# Patient Record
Sex: MALE | Race: WHITE | Employment: FULL TIME | URBAN - METROPOLITAN AREA
[De-identification: names, ages, dates, MRNs, and addresses within clinical notes are randomized per-mention and may not be internally consistent; named-entity substitution may affect disease eponyms.]

---

## 2017-10-13 DIAGNOSIS — G47.33 OBSTRUCTIVE SLEEP APNEA SYNDROME: Primary | ICD-10-CM

## 2019-08-28 ENCOUNTER — OFFICE VISIT (OUTPATIENT)
Dept: FAMILY MEDICINE | Facility: CLINIC | Age: 24
End: 2019-08-28
Payer: COMMERCIAL

## 2019-08-28 VITALS
DIASTOLIC BLOOD PRESSURE: 61 MMHG | WEIGHT: 227 LBS | HEART RATE: 61 BPM | SYSTOLIC BLOOD PRESSURE: 126 MMHG | OXYGEN SATURATION: 97 % | HEIGHT: 72 IN | BODY MASS INDEX: 30.75 KG/M2

## 2019-08-28 DIAGNOSIS — R68.82 DECREASED LIBIDO: ICD-10-CM

## 2019-08-28 DIAGNOSIS — Z13.220 LIPID SCREENING: ICD-10-CM

## 2019-08-28 DIAGNOSIS — G47.33 OSA (OBSTRUCTIVE SLEEP APNEA): Primary | ICD-10-CM

## 2019-08-28 DIAGNOSIS — R53.83 OTHER FATIGUE: ICD-10-CM

## 2019-08-28 PROCEDURE — 99203 OFFICE O/P NEW LOW 30 MIN: CPT | Performed by: INTERNAL MEDICINE

## 2019-08-28 SDOH — HEALTH STABILITY: MENTAL HEALTH: HOW MANY STANDARD DRINKS CONTAINING ALCOHOL DO YOU HAVE ON A TYPICAL DAY?: 1 OR 2

## 2019-08-28 SDOH — HEALTH STABILITY: MENTAL HEALTH: HOW OFTEN DO YOU HAVE 6 OR MORE DRINKS ON ONE OCCASION?: LESS THAN MONTHLY

## 2019-08-28 SDOH — HEALTH STABILITY: MENTAL HEALTH: HOW OFTEN DO YOU HAVE A DRINK CONTAINING ALCOHOL?: 2-4 TIMES A MONTH

## 2019-08-28 ASSESSMENT — MIFFLIN-ST. JEOR: SCORE: 2052.67

## 2019-08-28 NOTE — PROGRESS NOTES
Subjective     Prem Peters is a 23 year old male who presents to clinic today for the following health issues:    HPI   New Patient/Transfer of Care  Patient presenting to establish care and discuss some ongoing chronic and active medical issues; he had a history of obstructive sleep apnea diagnosed long time ago and recently 9 months ago had a home sleep study; patient is noncompliant with using his CPAP machine, he is intolerant to the facemask, describes he is a mouth breather, he feels like he becomes claustrophobic using the mask and removes the mask at night while sleeping.  He feels fatigued first couple hours when he wakes up, he works as a  from 3 PM to 9 PM.  He does not feel refreshed after good night sleep.  He does a lot of physical training and workout and does take some extra protein powder supplements.  He has some decreased libido over the last 2 to 3 months and he wants to be tested for testosterone.  He denies any chest pain, palpitations, dizziness or muscular pain.  Denies any other systemic complaints, no depression but does have some anxiety when talking to people.  family history of obstructive sleep apnea in both his parents.  His mom also has diagnosis polyangiitis granulomatosis.    Patient Active Problem List   Diagnosis     RON (obstructive sleep apnea)     Other fatigue     Decreased libido     Lipid screening     Past Surgical History:   Procedure Laterality Date     HC KNEE SCOPE,SINGLE MENISECTOMY      RIGHT MEDIAL MENISCUS TEAR     HERNIA REPAIR      RIGHT INGUINAL AT AGE 3     TONSILLECTOMY      2-3 YRS AGO       Social History     Tobacco Use     Smoking status: Never Smoker     Smokeless tobacco: Never Used   Substance Use Topics     Alcohol use: Yes     Alcohol/week: 0.0 oz     Frequency: 2-4 times a month     Drinks per session: 1 or 2     Binge frequency: Less than monthly     Family History   Problem Relation Age of Onset     Sleep Apnea Mother      Sleep  Apnea Father      Anxiety Disorder Father          No current outpatient medications on file.     No Known Allergies  Recent Labs   Lab Test 08/30/19  0850   *   HDL 51   TRIG 46   ALT 34   CR 1.11   GFRESTIMATED >90   GFRESTBLACK >90   POTASSIUM 4.3   TSH 1.68      BP Readings from Last 3 Encounters:   08/28/19 126/61   06/09/16 124/60    Wt Readings from Last 3 Encounters:   08/28/19 103 kg (227 lb)   06/09/16 91.9 kg (202 lb 9.6 oz)                    Reviewed and updated as needed this visit by Provider  Tobacco  Med Hx  Surg Hx  Soc Hx        Review of Systems   ROS COMP: Constitutional, HEENT, cardiovascular, pulmonary, gi and gu systems are negative, except as otherwise noted.      Objective    /61 (BP Location: Right arm, Patient Position: Sitting, Cuff Size: Adult Large)   Pulse 61   Ht 1.829 m (6')   Wt 103 kg (227 lb)   SpO2 97%   BMI 30.79 kg/m    Body mass index is 30.79 kg/m .  Physical Exam   GENERAL: healthy, alert and no distress, well-built and muscular.  EYES: Eyes grossly normal to inspection, PERRL and conjunctivae and sclerae normal  NECK: no adenopathy, no asymmetry, masses, or scars and thyroid normal to palpation  RESP: lungs clear to auscultation - no rales, rhonchi or wheezes  CV: regular rate and rhythm, normal S1 S2, no S3 or S4, no murmur, click or rub, no peripheral edema and peripheral pulses strong  ABDOMEN: soft, nontender, no hepatosplenomegaly, no masses and bowel sounds normal  MS: no gross musculoskeletal defects noted, no edema  SKIN: no suspicious lesions or rashes with flushed facies cheeks  NEURO: Normal strength and tone, mentation intact and speech normal  PSYCH: mentation appears normal, affect normal/bright    Diagnostic Test Results:  Labs reviewed in Epic        Assessment & Plan   Problem List Items Addressed This Visit        Respiratory    RON (obstructive sleep apnea) - Primary    Relevant Orders    SLEEP EVALUATION & MANAGEMENT REFERRAL -  ADULT -[facility] [phone]  (Age 18 and up)    CBC with platelets (Completed)       Behavioral    Decreased libido    Relevant Orders    Testosterone, total (Completed)       Other    Other fatigue    Relevant Orders    SLEEP EVALUATION & MANAGEMENT REFERRAL - ADULT [facility] [phone]  (Age 18 and up)    Comprehensive metabolic panel (Completed)    Testosterone, total (Completed)    TSH with free T4 reflex (Completed)    Lipid screening    Relevant Orders    Lipid panel reflex to direct LDL Fasting (Completed)         Advised importance of compliance with the CPAP machine.  Probably is most contributor to his fatigue.  We will check a CBC make sure he does not have polycythemia, he does have severe facial erythema; he attributes to anxiety.  He would like testosterone to be checked for some decreased libido over the last to 3 months he has been with the same partner for long time, also has some ED problems over the last couple months.  Denies any unusual stressors.  Advised him to cut on the excessive intake of protein powder that can increase his creatinine and keep well-hydrated.  Counseled on healthy diet and exercise; which he has been actively doing as a .    BMI:   Estimated body mass index is 30.79 kg/m  as calculated from the following:    Height as of this encounter: 1.829 m (6').    Weight as of this encounter: 103 kg (227 lb).   Weight management plan: Discussed healthy diet and exercise guidelines        Work on weight loss  Regular exercise  See Patient Instructions  Return in about 3 months (around 11/28/2019), or RON, FATIGUE, for Routine Visit.    .froylan Harrison MD  [facility]

## 2019-08-30 DIAGNOSIS — R68.82 DECREASED LIBIDO: ICD-10-CM

## 2019-08-30 DIAGNOSIS — Z13.220 LIPID SCREENING: ICD-10-CM

## 2019-08-30 DIAGNOSIS — R53.83 OTHER FATIGUE: ICD-10-CM

## 2019-08-30 DIAGNOSIS — G47.33 OSA (OBSTRUCTIVE SLEEP APNEA): ICD-10-CM

## 2019-08-30 LAB
ALBUMIN SERPL-MCNC: 4.2 G/DL (ref 3.4–5)
ALP SERPL-CCNC: 52 U/L (ref 40–150)
ALT SERPL W P-5'-P-CCNC: 34 U/L (ref 0–70)
ANION GAP SERPL CALCULATED.3IONS-SCNC: 4 MMOL/L (ref 3–14)
AST SERPL W P-5'-P-CCNC: 15 U/L (ref 0–45)
BILIRUB SERPL-MCNC: 0.9 MG/DL (ref 0.2–1.3)
BUN SERPL-MCNC: 20 MG/DL (ref 7–30)
CALCIUM SERPL-MCNC: 9.1 MG/DL (ref 8.5–10.1)
CHLORIDE SERPL-SCNC: 108 MMOL/L (ref 94–109)
CHOLEST SERPL-MCNC: 182 MG/DL
CO2 SERPL-SCNC: 30 MMOL/L (ref 20–32)
CREAT SERPL-MCNC: 1.11 MG/DL (ref 0.66–1.25)
ERYTHROCYTE [DISTWIDTH] IN BLOOD BY AUTOMATED COUNT: 11.6 % (ref 10–15)
GFR SERPL CREATININE-BSD FRML MDRD: >90 ML/MIN/{1.73_M2}
GLUCOSE SERPL-MCNC: 95 MG/DL (ref 70–99)
HCT VFR BLD AUTO: 44.1 % (ref 40–53)
HDLC SERPL-MCNC: 51 MG/DL
HGB BLD-MCNC: 15 G/DL (ref 13.3–17.7)
LDLC SERPL CALC-MCNC: 122 MG/DL
MCH RBC QN AUTO: 32.1 PG (ref 26.5–33)
MCHC RBC AUTO-ENTMCNC: 34 G/DL (ref 31.5–36.5)
MCV RBC AUTO: 94 FL (ref 78–100)
NONHDLC SERPL-MCNC: 131 MG/DL
PLATELET # BLD AUTO: 156 10E9/L (ref 150–450)
POTASSIUM SERPL-SCNC: 4.3 MMOL/L (ref 3.4–5.3)
PROT SERPL-MCNC: 6.9 G/DL (ref 6.8–8.8)
RBC # BLD AUTO: 4.68 10E12/L (ref 4.4–5.9)
SODIUM SERPL-SCNC: 142 MMOL/L (ref 133–144)
TRIGL SERPL-MCNC: 46 MG/DL
TSH SERPL DL<=0.005 MIU/L-ACNC: 1.68 MU/L (ref 0.4–4)
WBC # BLD AUTO: 4.4 10E9/L (ref 4–11)

## 2019-08-30 PROCEDURE — 84403 ASSAY OF TOTAL TESTOSTERONE: CPT | Performed by: INTERNAL MEDICINE

## 2019-08-30 PROCEDURE — 84443 ASSAY THYROID STIM HORMONE: CPT | Performed by: INTERNAL MEDICINE

## 2019-08-30 PROCEDURE — 85027 COMPLETE CBC AUTOMATED: CPT | Performed by: INTERNAL MEDICINE

## 2019-08-30 PROCEDURE — 80053 COMPREHEN METABOLIC PANEL: CPT | Performed by: INTERNAL MEDICINE

## 2019-08-30 PROCEDURE — 80061 LIPID PANEL: CPT | Performed by: INTERNAL MEDICINE

## 2019-08-30 PROCEDURE — 36415 COLL VENOUS BLD VENIPUNCTURE: CPT | Performed by: INTERNAL MEDICINE

## 2019-08-30 NOTE — LETTER
[facility]  [address] [place] [address] [address] [facility]  [address]  [place]  [address]  Tel: [phone]    September 3, 2019    Prem Peters  [address]  [address]  [place] [address]          Prem,     Your labs were all reviewed looks fine, including your kidney, electrolytes and liver enzymes are all normal.  your thyroid test called TSH is normal at 1.68   Your lipid shows a total cholesterol 182, triglycerides of 46, HDL good cholesterol of 51 normal;   LDL bad cholesterol slightly elevated at 122; continue to make changes in your diet low-fat low-cholesterol diet, exercise and weight loss as tolerated.   CBD shows normal white blood cell count ,normal hemoglobin hematocrit, normal platelet count.   Please be reassured.     If you have any further questions or problems, please contact our office.      Sincerely,    Jermaine Harrison MD /lori        Resulted Orders   Comprehensive metabolic panel   Result Value Ref Range    Sodium 142 133 - 144 mmol/L    Potassium 4.3 3.4 - 5.3 mmol/L    Chloride 108 94 - 109 mmol/L    Carbon Dioxide 30 20 - 32 mmol/L    Anion Gap 4 3 - 14 mmol/L    Glucose 95 70 - 99 mg/dL      Comment:      Fasting specimen    Urea Nitrogen 20 7 - 30 mg/dL    Creatinine 1.11 0.66 - 1.25 mg/dL    GFR Estimate >90 >60 mL/min/[1.73_m2]      Comment:      Non  GFR Calc  Starting 12/18/2018, serum creatinine based estimated GFR (eGFR) will be   calculated using the Chronic Kidney Disease Epidemiology Collaboration   (CKD-EPI) equation.      GFR Estimate If Black >90 >60 mL/min/[1.73_m2]      Comment:       GFR Calc  Starting 12/18/2018, serum creatinine based estimated GFR (eGFR) will be   calculated using the Chronic Kidney Disease Epidemiology Collaboration   (CKD-EPI) equation.      Calcium 9.1 8.5 - 10.1 mg/dL    Bilirubin Total 0.9 0.2 - 1.3 mg/dL    Albumin 4.2 3.4 - 5.0 g/dL    Protein Total 6.9 6.8 - 8.8 g/dL    Alkaline Phosphatase 52 40 - 150 U/L    ALT 34 0 -  70 U/L    AST 15 0 - 45 U/L   CBC with platelets   Result Value Ref Range    WBC 4.4 4.0 - 11.0 10e9/L    RBC Count 4.68 4.4 - 5.9 10e12/L    Hemoglobin 15.0 13.3 - 17.7 g/dL    Hematocrit 44.1 40.0 - 53.0 %    MCV 94 78 - 100 fl    MCH 32.1 26.5 - 33.0 pg    MCHC 34.0 31.5 - 36.5 g/dL    RDW 11.6 10.0 - 15.0 %    Platelet Count 156 150 - 450 10e9/L   TSH with free T4 reflex   Result Value Ref Range    TSH 1.68 0.40 - 4.00 mU/L   Lipid panel reflex to direct LDL Fasting   Result Value Ref Range    Cholesterol 182 <200 mg/dL    Triglycerides 46 <150 mg/dL      Comment:      Fasting specimen    HDL Cholesterol 51 >39 mg/dL    LDL Cholesterol Calculated 122 (H) <100 mg/dL      Comment:      Above desirable:  100-129 mg/dl  Borderline High:  130-159 mg/dL  High:             160-189 mg/dL  Very high:       >189 mg/dl      Non HDL Cholesterol 131 (H) <130 mg/dL      Comment:      Above Desirable:  130-159 mg/dl  Borderline high:  160-189 mg/dl  High:             190-219 mg/dl  Very high:       >219 mg/dl

## 2019-09-03 NOTE — RESULT ENCOUNTER NOTE
Please notify patient of the following lab results:  Prem;  Your labs were all reviewed looks fine, including your kidney, electrolytes and liver enzymes are all normal.  your thyroid test called TSH is normal at 1.68  Your lipid shows a total cholesterol 182, triglycerides of 46, HDL good cholesterol of 51 normal;  LDL bad cholesterol slightly elevated at 122; continue to make changes in your diet low-fat low-cholesterol diet, exercise and weight loss as tolerated.  CBD shows normal white blood cell count ,normal hemoglobin hematocrit, normal platelet count.  Please be reassured  Dr. Harrison

## 2019-09-04 LAB — TESTOST SERPL-MCNC: 1043 NG/DL (ref 240–950)

## 2019-09-05 ENCOUNTER — TELEPHONE (OUTPATIENT)
Dept: FAMILY MEDICINE | Facility: CLINIC | Age: 24
End: 2019-09-05

## 2019-09-05 DIAGNOSIS — R79.89 ELEVATED TESTOSTERONE LEVEL: Primary | ICD-10-CM

## 2019-09-05 NOTE — TELEPHONE ENCOUNTER
Left message asking patient to call back     Ludy APPLE RN      Result Notes for Testosterone, total     Notes recorded by Jermaine Harrison MD on 9/5/2019 at 5:05 AM CDT  Please call patient with the following lab results  Prem,  Your testosterone level was very elevated at 1043, please abstain from using any extra testosterone supplements.  If you are not taking any extra testosterone supplements and the level continues to go up we will need to do an ultrasound of the scrotal area/adrenal area to make sure there is no source of the increased testosterone  We will repeat testosterone in 4 weeks [in the morning level at 8 AM] to make sure level is decreasing  Any further questions happy to address  Dr. Harrison

## 2019-09-05 NOTE — RESULT ENCOUNTER NOTE
Please call patient with the following lab results  Prem,  Your testosterone level was very elevated at 1043, please abstain from using any extra testosterone supplements.  If you are not taking any extra testosterone supplements and the level continues to go up we will need to do an ultrasound of the scrotal area/adrenal area to make sure there is no source of the increased testosterone  We will repeat testosterone in 4 weeks [in the morning level at 8 AM] to make sure level is decreasing  Any further questions happy to address  Dr. Harrison

## 2019-09-06 NOTE — TELEPHONE ENCOUNTER
Attempt #2.  Non-detailed message left for patient to return call  JORGE RoachN, RN  [employer] [facility]

## 2019-09-10 NOTE — TELEPHONE ENCOUNTER
TO PCP:     Reached patient     Not taking any testosterone supplements - has not ever taken them      Poor sleep recently so pt was surprised thinking it would be low     Future lab visit set up in AM 9/30    Lab order pended (will need dx associated)     Ludy APPLE RN

## 2019-09-17 NOTE — TELEPHONE ENCOUNTER
PCP,    Pt scheduled at 7:15 AM for repeat testosterone lab draw. Will that be OK (instead of 8 AM?)?    Thank you,  Rivas MORENO RN

## 2019-09-30 DIAGNOSIS — R79.89 ELEVATED TESTOSTERONE LEVEL: ICD-10-CM

## 2019-09-30 PROCEDURE — 84403 ASSAY OF TOTAL TESTOSTERONE: CPT | Performed by: INTERNAL MEDICINE

## 2019-09-30 PROCEDURE — 36415 COLL VENOUS BLD VENIPUNCTURE: CPT | Performed by: INTERNAL MEDICINE

## 2019-10-02 LAB — TESTOST SERPL-MCNC: 1083 NG/DL (ref 240–950)

## 2019-10-03 DIAGNOSIS — R79.89 ELEVATED TESTOSTERONE LEVEL: Primary | ICD-10-CM

## 2019-10-04 ENCOUNTER — TELEPHONE (OUTPATIENT)
Dept: FAMILY MEDICINE | Facility: CLINIC | Age: 24
End: 2019-10-04

## 2019-10-04 DIAGNOSIS — R79.89 ELEVATED TESTOSTERONE LEVEL: Primary | ICD-10-CM

## 2019-10-04 NOTE — TELEPHONE ENCOUNTER
Called patient to determine where he would like us to fax external referral:     NO answer, left Vm to return call and provide     NAME OF CLINIC   FAX#     Pt Reps: Please gather this information on call back and let pt know we will fax to his preferred location     Thank you,   Nevaeh OCHOA RN

## 2019-10-04 NOTE — RESULT ENCOUNTER NOTE
Prem,  I reviewed your labs your testosterone level is still very elevated at 1083.  I recommend you see endocrinology for further evaluation of the increase.  If you agree we can put a referral ASAP.  Dr. Harrison

## 2019-10-04 NOTE — TELEPHONE ENCOUNTER
[facility] patients insurance does not require referrals.    Rachel Narvaez  Referral Coordinator

## 2019-10-04 NOTE — TELEPHONE ENCOUNTER
Reason for Call:  Referral Request    Detailed comments:   Pt is interested in referral.  Offered appt with [facility], however, pt didn't want to wait.  Please enter referral.  Pt will call with fax to let us know where he would like to go for endocrinology for testosterone.

## 2019-12-05 ENCOUNTER — TELEPHONE (OUTPATIENT)
Dept: GASTROENTEROLOGY | Facility: CLINIC | Age: 24
End: 2019-12-05

## 2019-12-05 NOTE — TELEPHONE ENCOUNTER
Left a detailed message for the referral process. No reason for request for clinic visit in the message from the call center. Left the process and access at this time. Left my name and number.

## 2019-12-05 NOTE — TELEPHONE ENCOUNTER
[facility] Call Center    Phone Message    May a detailed message be left on voicemail: yes    Reason for Call: Other: Pt called in wanting to be scheduled for an appointment. Pt did not have a referral and was directed toget all of his records sent to us for review. Please advise     Action Taken: Message routed to:  [facility] ([facility] Gastro

## 2019-12-11 ENCOUNTER — OFFICE VISIT (OUTPATIENT)
Dept: SLEEP MEDICINE | Facility: CLINIC | Age: 24
End: 2019-12-11
Payer: COMMERCIAL

## 2019-12-11 VITALS
WEIGHT: 226 LBS | HEART RATE: 52 BPM | SYSTOLIC BLOOD PRESSURE: 136 MMHG | HEIGHT: 72 IN | BODY MASS INDEX: 30.61 KG/M2 | DIASTOLIC BLOOD PRESSURE: 70 MMHG | OXYGEN SATURATION: 98 %

## 2019-12-11 DIAGNOSIS — R53.83 MALAISE AND FATIGUE: ICD-10-CM

## 2019-12-11 DIAGNOSIS — Z72.820 LACK OF ADEQUATE SLEEP: ICD-10-CM

## 2019-12-11 DIAGNOSIS — E66.09 CLASS 1 OBESITY DUE TO EXCESS CALORIES WITH SERIOUS COMORBIDITY AND BODY MASS INDEX (BMI) OF 30.0 TO 30.9 IN ADULT: ICD-10-CM

## 2019-12-11 DIAGNOSIS — E66.811 CLASS 1 OBESITY DUE TO EXCESS CALORIES WITH SERIOUS COMORBIDITY AND BODY MASS INDEX (BMI) OF 30.0 TO 30.9 IN ADULT: ICD-10-CM

## 2019-12-11 DIAGNOSIS — R06.89 DYSPNEA AND RESPIRATORY ABNORMALITY: Primary | ICD-10-CM

## 2019-12-11 DIAGNOSIS — G47.30 SLEEP APNEA, UNSPECIFIED TYPE: ICD-10-CM

## 2019-12-11 DIAGNOSIS — R06.00 DYSPNEA AND RESPIRATORY ABNORMALITY: Primary | ICD-10-CM

## 2019-12-11 DIAGNOSIS — R53.81 MALAISE AND FATIGUE: ICD-10-CM

## 2019-12-11 PROCEDURE — 99244 OFF/OP CNSLTJ NEW/EST MOD 40: CPT | Performed by: PHYSICIAN ASSISTANT

## 2019-12-11 RX ORDER — ZOLPIDEM TARTRATE 5 MG/1
TABLET ORAL
Qty: 1 TABLET | Refills: 0 | Status: SHIPPED | OUTPATIENT
Start: 2019-12-11 | End: 2020-02-03

## 2019-12-11 ASSESSMENT — MIFFLIN-ST. JEOR: SCORE: 2043.13

## 2019-12-11 NOTE — NURSING NOTE
Chief Complaint   Patient presents with     Sleep Problem     consult- to schedule a slep study and to seek treatment other then cpap       Initial /70   Pulse 52   Ht 1.829 m (6')   Wt 102.5 kg (226 lb)   SpO2 98%   BMI 30.65 kg/m   Estimated body mass index is 30.65 kg/m  as calculated from the following:    Height as of this encounter: 1.829 m (6').    Weight as of this encounter: 102.5 kg (226 lb).    Medication Reconciliation: complete    Neck circumference: 17 inches / 43 centimeters.

## 2019-12-11 NOTE — PATIENT INSTRUCTIONS
Your BMI is Body mass index is 30.65 kg/m .  Weight management is a personal decision.  If you are interested in exploring weight loss strategies, the following discussion covers the approaches that may be successful. Body mass index (BMI) is one way to tell whether you are at a healthy weight, overweight, or obese. It measures your weight in relation to your height.  A BMI of 18.5 to 24.9 is in the healthy range. A person with a BMI of 25 to 29.9 is considered overweight, and someone with a BMI of 30 or greater is considered obese. More than two-thirds of American adults are considered overweight or obese.  Being overweight or obese increases the risk for further weight gain. Excess weight may lead to heart disease and diabetes.  Creating and following plans for healthy eating and physical activity may help you improve your health.  Weight control is part of healthy lifestyle and includes exercise, emotional health, and healthy eating habits. Careful eating habits lifelong are the mainstay of weight control. Though there are significant health benefits from weight loss, long-term weight loss with diet alone may be very difficult to achieve- studies show long-term success with dietary management in less than 10% of people. Attaining a healthy weight may be especially difficult to achieve in those with severe obesity. In some cases, medications, devices and surgical management might be considered.  What can you do?  If you are overweight or obese and are interested in methods for weight loss, you should discuss this with your provider.     Consider reducing daily calorie intake by 500 calories.     Keep a food journal.     Avoiding skipping meals, consider cutting portions instead.    Diet combined with exercise helps maintain muscle while optimizing fat loss. Strength training is particularly important for building and maintaining muscle mass. Exercise helps reduce stress, increase energy, and improves fitness.  Increasing exercise without diet control, however, may not burn enough calories to loose weight.       Start walking three days a week 10-20 minutes at a time    Work towards walking thirty minutes five days a week     Eventually, increase the speed of your walking for 1-2 minutes at time    In addition, we recommend that you review healthy lifestyles and methods for weight loss available through the National Institutes of Health patient information sites:  http://win.niddk.nih.gov/publications/index.htm    And look into health and wellness programs that may be available through your health insurance provider, employer, local community center, or ap club.    Weight management plan: Patient was referred to their PCP to discuss a diet and exercise plan.

## 2019-12-11 NOTE — PROGRESS NOTES
Sleep Consultation:    Date on this visit: 12/11/2019    Prem Peters is sent by Jermaine Harrison for a sleep consultation regarding obstructive sleep apnea.    Primary Physician: Jermaine Harrison     Chief Complaint   Patient presents with     Sleep Problem     consult- to schedule a slep study and to seek treatment other than cpap     Prem Peters who Initially presented to [facility] for snoring and daytime sleepiness. Sleep study recommended.    Sleep study completed at [facility]: 1/2/2014 (250#)-AHI 50, RDI 64, lowest oxygen saturation was 87%, CPAP 5 cm/H20 effective.    He was prescribed auto-CPAP 5-10 cm/H20.    Initial visit within the [facility] was in 2016 with Dr. Esteban Jane. He noted at the time:  The patient had obtained the sleep study as he had symptoms of snoring and daytime sleepiness. When he initially started CPAP, there was a significant improvement in his sleep quality and daytime alertness. However, in the last few months, patient has experienced CPAP to be not as effective. He has also had trouble keeping CPAP on throughout the night and on many nights he will remove it without knowing in the middle of the night.     He has made many changes in an effort to decrease his sleep apnea. He has lost about 30 pounds and apparently his neck size has decreased.  We discussed other options at this time. I offered a polysomnogram to reassess the degree and nature of his sleep apnea at this time for us to plan treatment. The patient does not want to proceed with a sleep study at this time. He wants to see if a different mask interface will help him to keep CPAP throughout the night. He also wants to know if the pressure in the CPAP can be changed.     I agreed that he should have a mask fitting through DME. We also agreed on changing the pressure settings to auto titration CPAP settings with a pressure range of 5-10 cm of water. This will be to account for any pressure  "differences since his original titration.     I have counseled him in detail regarding alternative treatments for sleep apnea. He will try restarting CPAP therapy with a different mask interface and will keep me updated regarding progress. Alternatives will be reconsidered if he cannot adhere to CPAP treatment.     Today:  He reports he did not get used to using CPAP. He reports an honest attempt to use CPAP over the last 5-6 years.  He reports feeling \"claustrophobic\" and taking of mask in the middle of the night unknowingly.     He has lost ~25 lbs since his sleep study.     Prem goes to sleep at 10:00 PM during the week. He wakes up at 6:00 AM with an alarm. He falls asleep in 5 minutes.  Prem denies difficulty falling asleep.  He typically sleeps through the night. On weekends, Prem goes to sleep at 11:00 PM.  He wakes up at 6:00 AM with an alarm. He falls asleep in 5 minutes.  Patient gets an average of 8 hours of sleep per night. He does not feel refreshed.     Patient does not use electronics in bed, watch TV in bed and read in bed.     Prem does not do shift work.       Prem does snore every night and snoring is loud. Patient does have a regular bed partner. There is report of snoring.  He does have witnessed apneas. They never sleep separately.  Patient sleeps on his back, side and stomach. He denies no morning headaches, morning confusion and restless legs. Prem denies any bruxism, sleep walking, sleep talking, dream enactment, sleep paralysis, cataplexy and hypnogogic/hypnopompic hallucinations.    Prem denies difficulty breathing through his nose, claustrophobia and reflux at night.      Patient describes themself as a night person.  He would prefer to go to sleep at 2:00 AM and wake up at 12:00 PM.  Patient's [place] Sleepiness score 5/24 inconsistent with excessive  daytime sleepiness.  He reports fatigue.    Prem naps 0 times per week. He takes some inadvertant naps.  He denies falling asleep " while driving.   Patient was counseled on the importance of driving while alert, to pull over if drowsy, or nap before getting into the vehicle if sleepy.  He uses 1-2 cups/day of coffee. Last caffeine intake is usually before noon.    Allergies:    No Known Allergies    Medications:    Current Outpatient Medications   Medication Sig Dispense Refill     zolpidem (AMBIEN) 5 MG tablet Take tablet by mouth 15 minutes prior to sleep, for Sleep Study 1 tablet 0       Problem List:  Patient Active Problem List    Diagnosis Date Noted     Other fatigue 08/30/2019     Priority: Medium     Decreased libido 08/30/2019     Priority: Medium     Lipid screening 08/30/2019     Priority: Medium     RON (obstructive sleep apnea) 10/13/2017     Priority: Medium     MSI: 1/2/2014 (250#)-AHI 50, RDI 64, lowest oxygen saturation was 87%, CPAP 5 cm/H20 effective, recommended auto-CPAP 5-10 cm/H20.           Past Medical/Surgical History:  Past Medical History:   Diagnosis Date     RON (obstructive sleep apnea)      Past Surgical History:   Procedure Laterality Date     HC KNEE SCOPE,SINGLE MENISECTOMY      RIGHT MEDIAL MENISCUS TEAR     HERNIA REPAIR      RIGHT INGUINAL AT AGE 3     TONSILLECTOMY      2-3 YRS AGO       Social History:  Social History     Socioeconomic History     Marital status: Single     Spouse name: Not on file     Number of children: Not on file     Years of education: Not on file     Highest education level: Not on file   Occupational History     Not on file   Social Needs     Financial resource strain: Not on file     Food insecurity:     Worry: Not on file     Inability: Not on file     Transportation needs:     Medical: Not on file     Non-medical: Not on file   Tobacco Use     Smoking status: Never Smoker     Smokeless tobacco: Never Used   Substance and Sexual Activity     Alcohol use: Yes     Alcohol/week: 0.0 standard drinks     Frequency: 2-4 times a month     Drinks per session: 1 or 2     Binge frequency:  Less than monthly     Drug use: Never     Sexual activity: Yes     Partners: Female     Birth control/protection: Implant   Lifestyle     Physical activity:     Days per week: Not on file     Minutes per session: Not on file     Stress: Not on file   Relationships     Social connections:     Talks on phone: Not on file     Gets together: Not on file     Attends Yazidi service: Not on file     Active member of club or organization: Not on file     Attends meetings of clubs or organizations: Not on file     Relationship status: Not on file     Intimate partner violence:     Fear of current or ex partner: Not on file     Emotionally abused: Not on file     Physically abused: Not on file     Forced sexual activity: Not on file   Other Topics Concern     Not on file   Social History Narrative     Not on file       Family History:  Family History   Problem Relation Age of Onset     Sleep Apnea Mother      Sleep Apnea Father      Anxiety Disorder Father        Review of Systems:  A complete review of systems reviewed by me is negative with the exeption of what has been mentioned in the history of present illness.  CONSTITUTIONAL: NEGATIVE for weight gain/loss, fever, chills, sweats or night sweats, drug allergies.  EYES: NEGATIVE for changes in vision, blind spots, double vision.  ENT: NEGATIVE for ear pain, sore throat, sinus pain, post-nasal drip, runny nose, bloody nose  CARDIAC: NEGATIVE for fast heartbeats or fluttering in chest, chest pain or pressure, breathlessness when lying flat, swollen legs or swollen feet.  NEUROLOGIC: NEGATIVE headaches, weakness or numbness in the arms or legs.  DERMATOLOGIC: NEGATIVE for rashes, new moles or change in mole(s)  PULMONARY: NEGATIVE SOB at rest, SOB with activity, dry cough, productive cough, coughing up blood, wheezing or whistling when breathing.    GASTROINTESTINAL:  POSITIVE for  loose or watery stools and NEGATIVE for  nausea, vomiting, abdominal pain, bowel movements  black in color and blood in stool  GENITOURINARY: NEGATIVE for pain during urination, blood in urine, urinating more frequently than usual, irregular menstrual periods.  MUSCULOSKELETAL: NEGATIVE for muscle pain, bone or joint pain, swollen joints.  ENDOCRINE: NEGATIVE for increased thirst or urination, diabetes.  LYMPHATIC: NEGATIVE for swollen lymph nodes, lumps or bumps in the breasts or nipple discharge.    Physical Examination:  Vitals: /70   Pulse 52   Ht 1.829 m (6')   Wt 102.5 kg (226 lb)   SpO2 98%   BMI 30.65 kg/m    BMI= Body mass index is 30.65 kg/m .    Neck Cir (cm): 43 cm    Gamaliel Total Score 12/11/2019   Total score - Gamaliel 5       RUBEN Total Score: 8 (12/11/19 1000)    GENERAL APPEARANCE: alert and no distress  EYES: Eyes grossly normal to inspection, PERRL and conjunctivae and sclerae normal  HENT: ear canals and TM's normal, nose and mouth without ulcers or lesions, oropharynx crowded and tongue base enlarged  NECK: no adenopathy and no asymmetry, masses, or scars  RESP: lungs clear to auscultation - no rales, rhonchi or wheezes  CV: regular rates and rhythm and normal S1 S2, no S3 or S4  MS: extremities normal- no gross deformities noted  NEURO: Normal strength and tone, mentation intact and speech normal  PSYCH: mentation appears normal and affect normal/bright  Mallampati Class: III.  Tonsillar Stage:     Last Comprehensive Metabolic Panel:  Sodium   Date Value Ref Range Status   08/30/2019 142 133 - 144 mmol/L Final     Potassium   Date Value Ref Range Status   08/30/2019 4.3 3.4 - 5.3 mmol/L Final     Chloride   Date Value Ref Range Status   08/30/2019 108 94 - 109 mmol/L Final     Carbon Dioxide   Date Value Ref Range Status   08/30/2019 30 20 - 32 mmol/L Final     Anion Gap   Date Value Ref Range Status   08/30/2019 4 3 - 14 mmol/L Final     Glucose   Date Value Ref Range Status   08/30/2019 95 70 - 99 mg/dL Final     Comment:     Fasting specimen     Urea Nitrogen   Date  Value Ref Range Status   08/30/2019 20 7 - 30 mg/dL Final     Creatinine   Date Value Ref Range Status   08/30/2019 1.11 0.66 - 1.25 mg/dL Final     GFR Estimate   Date Value Ref Range Status   08/30/2019 >90 >60 mL/min/[1.73_m2] Final     Comment:     Non  GFR Calc  Starting 12/18/2018, serum creatinine based estimated GFR (eGFR) will be   calculated using the Chronic Kidney Disease Epidemiology Collaboration   (CKD-EPI) equation.       Calcium   Date Value Ref Range Status   08/30/2019 9.1 8.5 - 10.1 mg/dL Final     TSH   Date Value Ref Range Status   08/30/2019 1.68 0.40 - 4.00 mU/L Final       Impression/Plan:  1. Severe obstructive sleep apnea by sleep study on 1/2/2014 (250#)-AHI 50, RDI 64, lowest oxygen saturation was 87%, CPAP 5 cm/H20 was effective. He is currently on no treatment. Interim weight loss of 25 lbs noted.   He presents with snoring, witnessed apnea, daytime fatigue, non-refreshing sleep.   We reviewed options.   Decided on repeat polysomnogram to evaluate current severity of obstructive sleep apnea and treat the patient based on that. If he qualifies for PAP titration on the night of the sleep study, recommend starting with bilevel and not CPAP. Ambien if needed.   - He is a candidate for dental appliance for mild, moderate and low severe RON.  - If he has very severe obstructive sleep apnea, will consider bilevel or referral to ENT for possible upper airway surgery/stimulation.     Literature provided regarding sleep apnea.      He will follow up with me in approximately two weeks after his sleep study has been completed to review the results and discuss plan of care.       Polysomnography reviewed.  Obstructive sleep apnea reviewed.  Complications of untreated sleep apnea were reviewed.    Shaka Pollock PA-C    CC: Jermaine Harrison

## 2020-01-21 ENCOUNTER — THERAPY VISIT (OUTPATIENT)
Dept: SLEEP MEDICINE | Facility: CLINIC | Age: 25
End: 2020-01-21
Payer: COMMERCIAL

## 2020-01-21 DIAGNOSIS — E66.811 CLASS 1 OBESITY DUE TO EXCESS CALORIES WITH SERIOUS COMORBIDITY AND BODY MASS INDEX (BMI) OF 30.0 TO 30.9 IN ADULT: ICD-10-CM

## 2020-01-21 DIAGNOSIS — R06.89 DYSPNEA AND RESPIRATORY ABNORMALITY: ICD-10-CM

## 2020-01-21 DIAGNOSIS — Z72.820 LACK OF ADEQUATE SLEEP: ICD-10-CM

## 2020-01-21 DIAGNOSIS — R06.00 DYSPNEA AND RESPIRATORY ABNORMALITY: ICD-10-CM

## 2020-01-21 DIAGNOSIS — G47.30 SLEEP APNEA, UNSPECIFIED TYPE: ICD-10-CM

## 2020-01-21 DIAGNOSIS — R53.81 MALAISE AND FATIGUE: ICD-10-CM

## 2020-01-21 DIAGNOSIS — G47.33 OSA (OBSTRUCTIVE SLEEP APNEA): Chronic | ICD-10-CM

## 2020-01-21 DIAGNOSIS — R53.83 MALAISE AND FATIGUE: ICD-10-CM

## 2020-01-21 DIAGNOSIS — E66.09 CLASS 1 OBESITY DUE TO EXCESS CALORIES WITH SERIOUS COMORBIDITY AND BODY MASS INDEX (BMI) OF 30.0 TO 30.9 IN ADULT: ICD-10-CM

## 2020-01-21 PROCEDURE — 95810 POLYSOM 6/> YRS 4/> PARAM: CPT | Performed by: INTERNAL MEDICINE

## 2020-01-23 PROBLEM — G47.33 OSA (OBSTRUCTIVE SLEEP APNEA): Chronic | Status: ACTIVE | Noted: 2017-10-13

## 2020-01-23 PROBLEM — R68.82 DECREASED LIBIDO: Status: ACTIVE | Noted: 2019-08-30

## 2020-01-23 PROBLEM — R53.83 OTHER FATIGUE: Status: ACTIVE | Noted: 2019-08-30

## 2020-01-23 PROBLEM — Z13.220 LIPID SCREENING: Status: ACTIVE | Noted: 2019-08-30

## 2020-01-23 NOTE — PROCEDURES
SLEEP STUDY INTERPRETATION  DIAGNOSTIC POLYSOMNOGRAPHY REPORT      Patient: DEXTER GUERRERO  YOB: 1995  Study Date: 1/21/2020  MRN: [id number]  Referring Provider: Jermaine Harrison MD  Ordering Provider: BRITTANEY Carney    Indications for Polysomnography: The patient is a 24 y old Male who is 6' and weighs 226.0 lbs. His BMI is 30.9, [place] sleepiness scale 5.0 and neck circumference is 43.0 cm. A diagnostic polysomnogram was performed to evaluate for history of severe obstructive sleep apnea by sleep study on 1/2/2014 (250#)- AHI 50, RDI 64, lowest oxygen saturation was 87%, CPAP 5 cm/H20 was effective. He is currently on no treatment. Interim weight loss of 25 lbs noted.  He presents with snoring, witnessed apnea, daytime fatigue, non-refreshing sleep.      Polysomnogram Data: A full night polysomnogram recorded the standard physiologic parameters including EEG, EOG, EMG, ECG, nasal and oral airflow. Respiratory parameters of chest and abdominal movements were recorded with respiratory inductance plethysmography. Oxygen saturation was recorded by pulse oximetry. Hypopnea scoring rule used: 1B 4%.      Sleep Architecture:   The total recording time of the polysomnogram was 461.1 minutes. The total sleep time was 435.5 minutes. Sleep latency was decreased at 7.0 minutes with the use of a sleep aid (melatonin). REM latency was 79.5 minutes. Arousal index was 20.8 arousals per hour. Sleep efficiency was normal at 94.5%. Wake after sleep onset was 17.5 minutes. The patient spent 8.5% of total sleep time in Stage N1, 49.1% in Stage N2, 17.6% in Stage N3, and 24.8% in REM. Time in REM supine was 91.0 minutes.      Respiration:     Events ? The polysomnogram revealed a presence of 1 obstructive, 1 central, and 0 mixed apneas resulting in an apnea index of 0.3 events per hour. There were 0 obstructive hypopneas and 0 central hypopneas resulting in an obstructive hypopnea index of 0 and central  hypopnea index of 0 events per hour. The combined apnea/hypopnea index was 0.3 events per hour (central apnea/hypopnea index was 0.1 events per hour). The REM AHI was 0.6 events per hour. The supine AHI was 0.3 events per hour. The RERA index was 5.1 events per hour.  The RDI was 5.4 events per hour.    Snoring - was reported as mild to moderate    Respiratory rate and pattern - was notable for normal respiratory rate and pattern.    Sustained Sleep Associated Hypoventilation - Transcutaneous carbon dioxide monitoring was not used, however significant hypoventilation was not suggested by oximetry    Sleep Associated Hypoxemia - (Greater than 5 minutes O2 sat at or below 88%) was not present. Baseline oxygen saturation was 96.6%. Lowest oxygen saturation was 91.1%. Time spent less than or equal to 88% was 0 minutes. Time spent less than or equal to 89% was 0 minutes.    Movement Activity:     Periodic Limb Activity - There were 155 PLMs during the entire study. The PLM index was 21.4 movements per hour. The PLM Arousal Index was 5.9 per hour.    REM EMG Activity - Excessive transient/sustained muscle activity was not present.    Nocturnal Behavior - Abnormal sleep related behaviors were not noted    Bruxism - None apparent.      Cardiac Summary:   The average pulse rate was 47.3 bpm. The minimum pulse rate was 36.9 bpm while the maximum pulse rate was 90.9 bpm.  Arrhythmias were not noted.      Assessment:     No significant obstructive sleep apnea    Periodic limb movements of sleep with mild associated arousals    Recommendations:    Suggest optimizing sleep schedule and avoiding sleep deprivation.    Weight management (if BMI > 30).    Pharmacologic therapy should be used for management of restless legs syndrome only if present and clinically indicated and not based on the presence of periodic limb movements alone.    Diagnostic Codes:   Snoring     _____________________________________   Electronically Signed By:  Italo Garcia MD 1/23/20           Range(%) Time in range (min)   0.0 - 89.0 -   0.0 - 88.0 -         Stage Min(mm Hg) Max(mm Hg)   Wake - -   NREM(1+2+3) - -   REM - -       Range(mmHg) Time in range (min)   55.0 - 100.0 -   Excluded data <20.0 & >65.0 461.5

## 2020-01-24 LAB — SLPCOMP: NORMAL

## 2020-01-29 ENCOUNTER — TELEPHONE (OUTPATIENT)
Dept: SLEEP MEDICINE | Facility: CLINIC | Age: 25
End: 2020-01-29

## 2020-01-30 NOTE — TELEPHONE ENCOUNTER
Called patient because I didn't cancel his follow-up with Abass on 02/09/20 as he needs to get his sleep study results. There is an issue with his insurance and will have to pay out of pocket. Waiting to hear back from the patient as to what he would like to do with this appointment.    Judi Pack

## 2020-01-30 NOTE — TELEPHONE ENCOUNTER
Patient called because his insurance changed as of 01/01/2020. Patient bought insurance with [place] [employer], it's a [employer] plan that isn't a plan we have on our [employer]. I believe this should be coded under 954. Patient hasn't received his insurance card yet, I looked it up under [employer]...  Health Plan: [id number] [employer] [place] [id number].    ID: [id number]  GRP: [id number]    I did e-mail the patient to ask if he has an actual card. It states on [employer] that theres a 6000.00 deductible.     I explained we would need the insurance card, put the insurance in the system and then bill the insurance at that point.    Judi Pack

## 2020-02-03 ENCOUNTER — OFFICE VISIT (OUTPATIENT)
Dept: SLEEP MEDICINE | Facility: CLINIC | Age: 25
End: 2020-02-03
Payer: COMMERCIAL

## 2020-02-03 VITALS
WEIGHT: 231 LBS | BODY MASS INDEX: 31.29 KG/M2 | HEIGHT: 72 IN | HEART RATE: 52 BPM | SYSTOLIC BLOOD PRESSURE: 132 MMHG | DIASTOLIC BLOOD PRESSURE: 71 MMHG | OXYGEN SATURATION: 98 %

## 2020-02-03 DIAGNOSIS — G47.33 OSA (OBSTRUCTIVE SLEEP APNEA): ICD-10-CM

## 2020-02-03 PROCEDURE — 99213 OFFICE O/P EST LOW 20 MIN: CPT | Performed by: PHYSICIAN ASSISTANT

## 2020-02-03 ASSESSMENT — MIFFLIN-ST. JEOR: SCORE: 2065.81

## 2020-02-03 NOTE — PROGRESS NOTES
Sleep Study Follow-Up Visit:    Date on this visit: 2/3/2020    Prem Peters comes in today for follow-up of his sleep study done on 1/21/20120 at the [facility].  A diagnostic polysomnogram was performed to evaluate for history of severe obstructive sleep apnea by sleep study on 1/2/2014 (250#)- AHI 50, RDI 64, lowest oxygen saturation was 87%, CPAP 5 cm/H20 was effective. He is currently on no treatment. Interim weight loss of 25 lbs noted.  He presents with snoring, witnessed apnea, daytime fatigue, non-refreshing sleep.    The Polysomnogram was interpreted by Dr. Garcia:  Diagnostic PSG   Sleep Architecture: The total recording time of the polysomnogram was 461.1 minutes. The total sleep time was 435.5 minutes. Sleep latency was decreased at 7.0 minutes with the use of a sleep aid (melatonin). REM latency was 79.5 minutes. Arousal index was 20.8 arousals per hour. Sleep efficiency was normal at 94.5%. Wake after sleep onset was 17.5 minutes. The patient spent 8.5% of total sleep time in Stage N1, 49.1% in Stage N2, 17.6% in Stage N3, and 24.8% in REM. Time in REM supine was 91.0 minutes.  Respiration:     Events ? The polysomnogram revealed a presence of 1 obstructive, 1 central, and 0 mixed apneas resulting in an apnea index of 0.3 events per hour. There were 0 obstructive hypopneas and 0 central hypopneas resulting in an obstructive hypopnea index of 0 and central hypopnea index of 0 events per hour. The combined apnea/hypopnea index was 0.3 events per hour (central apnea/hypopnea index was 0.1 events per hour). The REM AHI was 0.6 events per hour. The supine AHI was 0.3 events per hour. The RERA index was 5.1 events per hour.  The RDI was 5.4 events per hour.    Snoring - was reported as mild to moderate    Respiratory rate and pattern - was notable for normal respiratory rate and pattern.    Sustained Sleep Associated Hypoventilation - Transcutaneous carbon dioxide monitoring was  not used, however significant hypoventilation was not suggested by oximetry    Sleep Associated Hypoxemia - (Greater than 5 minutes O2 sat at or below 88%) was not present. Baseline oxygen saturation was 96.6%. Lowest oxygen saturation was 91.1%. Time spent less than or equal to 88% was 0 minutes. Time spent less than or equal to 89% was 0 minutes.   Movement Activity:     Periodic Limb Activity - There were 155 PLMs during the entire study. The PLM index was 21.4 movements per hour. The PLM Arousal Index was 5.9 per hour.    REM EMG Activity - Excessive transient/sustained muscle activity was not present.    Nocturnal Behavior - Abnormal sleep related behaviors were not noted    Bruxism - None apparent.   Cardiac Summary:   The average pulse rate was 47.3 bpm. The minimum pulse rate was 36.9 bpm while the maximum pulse rate was 90.9 bpm.  Arrhythmias were not noted.  These findings were reviewed with patient.     Past medical/surgical history, family history, social history, medications and allergies were reviewed.      Problem List:  Patient Active Problem List    Diagnosis Date Noted     Other fatigue 08/30/2019     Priority: Medium     Decreased libido 08/30/2019     Priority: Medium     Lipid screening 08/30/2019     Priority: Medium     History of RON (obstructive sleep apnea) - resolved 10/13/2017     Priority: Medium     MSI: 1/2/2014 (250#)-AHI 50, RDI 64, lowest oxygen saturation was 87%, CPAP 5 cm/H20 effective, recommended auto-CPAP 5-10 cm/H20.   1/21/2020 [facility] (226.0 lbs) - AHI 0.3, RDI 5.4, Supine AHI 0.3, REM AHI 0.6, Low O2 91.1%, Time Spent ?88% 0 minutes / Time Spent ?89% 0 minutes.        /71   Pulse 52   Ht 1.829 m (6')   Wt 104.8 kg (231 lb)   SpO2 98%   BMI 31.33 kg/m      Impression/Plan:  The patient's sleep disordered breathing did not reach a level of clinical significance with AHI of 0.3 and RDI 5.4 events per hour. This was a good study that included  time spent in REM sleep.   - Keeping off the lost weight is recommended to address long term risk of sleep apnea.  - Discussed extension of sleep time and reviewed methods.     Patient had elevated periodic limp movements during the study. The majority of these were not associated with cortical arousal. Patient denies wakeful motor restlessness. Will follow.     He will follow up with me as needed.     Fifteen minutes spent with patient, all of which were spent face-to-face counseling, consulting, coordinating plan of care.      Shaka Pollock PA-C    CC: Jermaine Harrison

## 2020-02-03 NOTE — PATIENT INSTRUCTIONS
Your BMI is Body mass index is 31.33 kg/m .  Weight management is a personal decision.  If you are interested in exploring weight loss strategies, the following discussion covers the approaches that may be successful. Body mass index (BMI) is one way to tell whether you are at a healthy weight, overweight, or obese. It measures your weight in relation to your height.  A BMI of 18.5 to 24.9 is in the healthy range. A person with a BMI of 25 to 29.9 is considered overweight, and someone with a BMI of 30 or greater is considered obese. More than two-thirds of [place] adults are considered overweight or obese.  Being overweight or obese increases the risk for further weight gain. Excess weight may lead to heart disease and diabetes.  Creating and following plans for healthy eating and physical activity may help you improve your health.  Weight control is part of healthy lifestyle and includes exercise, emotional health, and healthy eating habits. Careful eating habits lifelong are the mainstay of weight control. Though there are significant health benefits from weight loss, long-term weight loss with diet alone may be very difficult to achieve- studies show long-term success with dietary management in less than 10% of people. Attaining a healthy weight may be especially difficult to achieve in those with severe obesity. In some cases, medications, devices and surgical management might be considered.  What can you do?  If you are overweight or obese and are interested in methods for weight loss, you should discuss this with your provider.     Consider reducing daily calorie intake by 500 calories.     Keep a food journal.     Avoiding skipping meals, consider cutting portions instead.    Diet combined with exercise helps maintain muscle while optimizing fat loss. Strength training is particularly important for building and maintaining muscle mass. Exercise helps reduce stress, increase energy, and improves fitness.  Increasing exercise without diet control, however, may not burn enough calories to loose weight.       Start walking three days a week 10-20 minutes at a time    Work towards walking thirty minutes five days a week     Eventually, increase the speed of your walking for 1-2 minutes at time    In addition, we recommend that you review healthy lifestyles and methods for weight loss available through the National [employer] of [employer] patient information sites:  http://win.niddk.nih.gov/publications/index.htm    And look into health and wellness programs that may be available through your health insurance provider, employer, local community center, or ap club.    Weight management plan: Patient was referred to their PCP to discuss a diet and exercise plan.

## 2020-02-03 NOTE — NURSING NOTE
Chief Complaint   Patient presents with     Study Results       Initial /71   Pulse 52   Ht 1.829 m (6')   Wt 104.8 kg (231 lb)   SpO2 98%   BMI 31.33 kg/m   Estimated body mass index is 31.33 kg/m  as calculated from the following:    Height as of this encounter: 1.829 m (6').    Weight as of this encounter: 104.8 kg (231 lb).    Medication Reconciliation: complete

## 2022-09-11 ENCOUNTER — HEALTH MAINTENANCE LETTER (OUTPATIENT)
Age: 27
End: 2022-09-11

## 2023-10-07 ENCOUNTER — HEALTH MAINTENANCE LETTER (OUTPATIENT)
Age: 28
End: 2023-10-07

## 2025-01-05 ENCOUNTER — TELEPHONE (OUTPATIENT)
Dept: FAMILY MEDICINE | Facility: CLINIC | Age: 30
End: 2025-01-05
Payer: COMMERCIAL

## 2025-01-05 NOTE — TELEPHONE ENCOUNTER
Reason for Call:  Appointment Request    Patient requesting this type of appt:  [facility]    Requested provider: Nicol Sorensen    Reason patient unable to be scheduled: Not with their preferred provider    When does patient want to be seen/preferred time:  See notes below    Comments: Nicol Leonardo is not taking New patients at this time, however, Patient is very interested in [facility] and setting up an appt with Nicol Leonardo, when possible.     Please call patient back either way, to let him know if Dr. Sorensen can make any adjustments, if not, when will he open up his schedule for New patients.     Could we send this information to you in [employer] or would you prefer to receive a phone call?:   Patient would prefer a phone call   Okay to leave a detailed message?: Yes at Cell number on file:    Telephone Information:   Mobile [phone]       Call taken on 1/5/2025 at 5:11 PM by FLEX JOHNSON

## 2025-01-06 NOTE — TELEPHONE ENCOUNTER
No problem, we can schedule a physical.  Nicol Sorensen MD  [facility].   [phone]